# Patient Record
Sex: FEMALE | Race: WHITE | NOT HISPANIC OR LATINO | URBAN - METROPOLITAN AREA
[De-identification: names, ages, dates, MRNs, and addresses within clinical notes are randomized per-mention and may not be internally consistent; named-entity substitution may affect disease eponyms.]

---

## 2017-06-12 PROBLEM — C50.919 BREAST CANCER: Status: ACTIVE | Noted: 2017-06-12

## 2017-06-12 PROBLEM — G43.909 MIGRAINE: Status: ACTIVE | Noted: 2017-06-12

## 2017-06-12 PROBLEM — J45.909 RAD (REACTIVE AIRWAY DISEASE): Status: ACTIVE | Noted: 2017-06-12

## 2017-06-12 PROBLEM — C73 THYROID CANCER: Status: ACTIVE | Noted: 2017-06-12

## 2023-08-03 ENCOUNTER — HOSPITAL ENCOUNTER (EMERGENCY)
Facility: HOSPITAL | Age: 63
Discharge: LEFT BEFORE DISCHARGE | End: 2023-08-03
Attending: EMERGENCY MEDICINE

## 2023-08-03 VITALS
DIASTOLIC BLOOD PRESSURE: 85 MMHG | RESPIRATION RATE: 15 BRPM | BODY MASS INDEX: 23.9 KG/M2 | HEART RATE: 81 BPM | HEIGHT: 64 IN | WEIGHT: 140 LBS | OXYGEN SATURATION: 96 % | SYSTOLIC BLOOD PRESSURE: 169 MMHG | TEMPERATURE: 98.4 F

## 2023-08-03 DIAGNOSIS — S01.511A COMPLICATED LACERATION OF LIP, INITIAL ENCOUNTER: Primary | ICD-10-CM

## 2023-08-03 PROCEDURE — 99281 EMR DPT VST MAYX REQ PHY/QHP: CPT

## 2023-08-04 NOTE — ED PROVIDER NOTES
Subjective   History of Present Illness  Is a 63-year-old female presented to the emergency department after an axonal fall.  The patient states that she was walking on a gravel parking lot when she tripped and fell forward.  She hit her face on the gravel.  Patient has a large laceration to the upper lip.  It is through and through.  He had some abrasions to the nose and face as well.  Patient states that she is up-to-date on her tetanus immunization.  Patient did not lose consciousness during the event.  Patient complaining of some pain in the lip area.  She denies any other injuries.  She is ambulatory.  Denies any headache or change in vision.  No focal weakness.  No chest pain or shortness of breath.  No abdominal pain or vomiting.    History provided by:  Patient   used: No      Review of Systems   Constitutional:  Negative for chills and fever.   HENT:  Negative for congestion, ear pain and sore throat.    Eyes:  Negative for visual disturbance.   Respiratory:  Negative for shortness of breath.    Cardiovascular:  Negative for chest pain.   Gastrointestinal:  Negative for abdominal pain.   Genitourinary:  Negative for difficulty urinating.   Musculoskeletal:  Negative for arthralgias.   Skin:  Positive for wound. Negative for rash.   Neurological:  Negative for dizziness, weakness and numbness.   Psychiatric/Behavioral:  Negative for agitation.      Past Medical History:   Diagnosis Date    Breast cancer     Migraine     RAD (reactive airway disease)     Thyroid cancer        Allergies   Allergen Reactions    Augmentin [Amoxicillin-Pot Clavulanate]     Bactrim [Sulfamethoxazole-Trimethoprim]     Erythromycin     Keflex [Cephalexin] Diarrhea    Latex        Past Surgical History:   Procedure Laterality Date     SECTION      KNEE ARTHROSCOPY Bilateral     MASTECTOMY Bilateral     THYROIDECTOMY, PARTIAL         No family history on file.    Social History     Tobacco Use    Smoking  status: Never   Substance and Sexual Activity    Alcohol use: No           Objective   Physical Exam  Vitals and nursing note reviewed.   Constitutional:       General: She is not in acute distress.     Appearance: She is not ill-appearing or toxic-appearing.   HENT:      Nose:        Comments: Patient has a large through and through laceration extending from the upper philtrum through the vermilion border through the anterior of the upper lip.  It is completely .  Patient also appears to have lost a little bit of tissue in between the wound.  She also has some abrasions over the nose and face.  Teeth appear intact.     Mouth/Throat:      Pharynx: No posterior oropharyngeal erythema.   Eyes:      Conjunctiva/sclera: Conjunctivae normal.      Pupils: Pupils are equal, round, and reactive to light.   Cardiovascular:      Rate and Rhythm: Normal rate and regular rhythm.   Pulmonary:      Effort: Pulmonary effort is normal. No respiratory distress.   Abdominal:      General: Abdomen is flat. There is no distension.      Palpations: There is no mass.      Tenderness: There is no abdominal tenderness. There is no guarding or rebound.   Musculoskeletal:         General: No deformity. Normal range of motion.   Skin:     General: Skin is warm.      Findings: No rash.   Neurological:      General: No focal deficit present.      Mental Status: She is alert and oriented to person, place, and time.      Motor: No weakness.       Procedures           ED Course  ED Course as of 08/03/23 2226   Thu Aug 03, 2023   2223 On reevaluation, the patient is requesting plastic surgery evaluation.  I did explain to her that I do not currently have plastic surgery at this facility.  I do recommend transfer to  for evaluation.  I did explain to the patient that I could close the wound in our emergency department and get her to appropriate follow-up.  However she is concerned about scarring and would prefer plastic surgeon to  complete the repairs.  I do feel this is appropriate given the nature of the actual wound. [JK]   4130 I did call [facility] transfer line and notified them regarding about the patient.  The patient states that she did not want any testing or imaging done at this facility.  She just wanted to head to be evaluated at plastic surgery.  Patient left from the emergency department and had a friend drive her over privately to the emergency department.  She did not wait for confirmation of the transfer. [JK]      ED Course User Index  [JK] Memo Addison MD                                           Medical Decision Making  This is a 63-year-old female presented to the emergency department with a laceration to the upper lip.  Patient had an axonal fall.  She fell on gravel.  The patient does have a significant complex laceration to the upper lip.  She appears to have lost some tissue from the exterior of the lip as well.  Patient will require thorough irrigation as well as suture repair.  I do also recommend CT of the maxillofacial region.  Patient's tetanus is up-to-date.      Differential diagnosis: Complex laceration, facial fracture, contusion, dental fracture      Amount and/or Complexity of Data Reviewed  Radiology: ordered.        Final diagnoses:   Complicated laceration of lip, initial encounter       ED Disposition  ED Disposition       ED Disposition   Discharge    Condition   Stable    Comment   --               [facility] EM[facility] DEPART[facility]  [address] [facility] [address] [phone]    Return to ER to complete workup and treatment         Medication List      No changes were made to your prescriptions during this visit.            Memo Addison MD  08/03/23 9748